# Patient Record
Sex: FEMALE | Race: BLACK OR AFRICAN AMERICAN | NOT HISPANIC OR LATINO | Employment: PART TIME | URBAN - METROPOLITAN AREA
[De-identification: names, ages, dates, MRNs, and addresses within clinical notes are randomized per-mention and may not be internally consistent; named-entity substitution may affect disease eponyms.]

---

## 2017-02-08 ENCOUNTER — HOSPITAL ENCOUNTER (EMERGENCY)
Facility: HOSPITAL | Age: 27
Discharge: HOME | End: 2017-02-08
Attending: EMERGENCY MEDICINE
Payer: MEDICAID

## 2017-02-08 VITALS
RESPIRATION RATE: 16 BRPM | OXYGEN SATURATION: 99 % | HEIGHT: 64 IN | WEIGHT: 130 LBS | BODY MASS INDEX: 22.2 KG/M2 | SYSTOLIC BLOOD PRESSURE: 126 MMHG | DIASTOLIC BLOOD PRESSURE: 75 MMHG | TEMPERATURE: 98 F | HEART RATE: 70 BPM

## 2017-02-08 DIAGNOSIS — S09.93XA BLUNT TRAUMA OF FACE, INITIAL ENCOUNTER: Primary | ICD-10-CM

## 2017-02-08 DIAGNOSIS — S09.90XA CLOSED HEAD INJURY, INITIAL ENCOUNTER: ICD-10-CM

## 2017-02-08 DIAGNOSIS — S03.2XXA: ICD-10-CM

## 2017-02-08 DIAGNOSIS — S02.5XXA: ICD-10-CM

## 2017-02-08 DIAGNOSIS — R56.9 SEIZURE: ICD-10-CM

## 2017-02-08 DIAGNOSIS — S03.2XXA TOOTH AVULSION, INITIAL ENCOUNTER: ICD-10-CM

## 2017-02-08 LAB
ALBUMIN SERPL BCP-MCNC: 3.9 G/DL
ALP SERPL-CCNC: 71 U/L
ALT SERPL W/O P-5'-P-CCNC: 5 U/L
AMPHET+METHAMPHET UR QL: NEGATIVE
ANION GAP SERPL CALC-SCNC: 8 MMOL/L
AST SERPL-CCNC: 13 U/L
B-HCG UR QL: NEGATIVE
BARBITURATES UR QL SCN>200 NG/ML: NEGATIVE
BASOPHILS # BLD AUTO: 0.03 K/UL
BASOPHILS NFR BLD: 0.5 %
BENZODIAZ UR QL SCN>200 NG/ML: NEGATIVE
BILIRUB SERPL-MCNC: 0.4 MG/DL
BUN SERPL-MCNC: 11 MG/DL
BZE UR QL SCN: NEGATIVE
CALCIUM SERPL-MCNC: 9.3 MG/DL
CANNABINOIDS UR QL SCN: NEGATIVE
CHLORIDE SERPL-SCNC: 108 MMOL/L
CO2 SERPL-SCNC: 24 MMOL/L
CREAT SERPL-MCNC: 0.9 MG/DL
CREAT UR-MCNC: 92.7 MG/DL
CTP QC/QA: YES
DIFFERENTIAL METHOD: ABNORMAL
EOSINOPHIL # BLD AUTO: 0.2 K/UL
EOSINOPHIL NFR BLD: 3.3 %
ERYTHROCYTE [DISTWIDTH] IN BLOOD BY AUTOMATED COUNT: 11.8 %
EST. GFR  (AFRICAN AMERICAN): >60 ML/MIN/1.73 M^2
EST. GFR  (NON AFRICAN AMERICAN): >60 ML/MIN/1.73 M^2
GLUCOSE SERPL-MCNC: 70 MG/DL
HCT VFR BLD AUTO: 35.1 %
HGB BLD-MCNC: 11.7 G/DL
LYMPHOCYTES # BLD AUTO: 2.2 K/UL
LYMPHOCYTES NFR BLD: 36.3 %
MAGNESIUM SERPL-MCNC: 2.1 MG/DL
MCH RBC QN AUTO: 29 PG
MCHC RBC AUTO-ENTMCNC: 33.3 %
MCV RBC AUTO: 87 FL
METHADONE UR QL SCN>300 NG/ML: NEGATIVE
MONOCYTES # BLD AUTO: 0.4 K/UL
MONOCYTES NFR BLD: 6.5 %
NEUTROPHILS # BLD AUTO: 3.2 K/UL
NEUTROPHILS NFR BLD: 53.4 %
OPIATES UR QL SCN: NEGATIVE
PCP UR QL SCN>25 NG/ML: NEGATIVE
PLATELET # BLD AUTO: 161 K/UL
PMV BLD AUTO: 10.2 FL
POCT GLUCOSE: 70 MG/DL (ref 70–110)
POTASSIUM SERPL-SCNC: 4.4 MMOL/L
PROT SERPL-MCNC: 7.3 G/DL
RBC # BLD AUTO: 4.04 M/UL
SODIUM SERPL-SCNC: 140 MMOL/L
TOXICOLOGY INFORMATION: NORMAL
WBC # BLD AUTO: 5.98 K/UL

## 2017-02-08 PROCEDURE — 81025 URINE PREGNANCY TEST: CPT | Performed by: EMERGENCY MEDICINE

## 2017-02-08 PROCEDURE — 82570 ASSAY OF URINE CREATININE: CPT

## 2017-02-08 PROCEDURE — 85025 COMPLETE CBC W/AUTO DIFF WBC: CPT

## 2017-02-08 PROCEDURE — 82962 GLUCOSE BLOOD TEST: CPT

## 2017-02-08 PROCEDURE — 80053 COMPREHEN METABOLIC PANEL: CPT

## 2017-02-08 PROCEDURE — 83735 ASSAY OF MAGNESIUM: CPT

## 2017-02-08 PROCEDURE — 99285 EMERGENCY DEPT VISIT HI MDM: CPT

## 2017-02-08 RX ORDER — IBUPROFEN 600 MG/1
600 TABLET ORAL EVERY 6 HOURS PRN
Qty: 20 TABLET | Refills: 0 | Status: SHIPPED | OUTPATIENT
Start: 2017-02-08 | End: 2020-07-11 | Stop reason: SDUPTHER

## 2017-02-08 RX ORDER — AMOXICILLIN 500 MG/1
1000 CAPSULE ORAL EVERY 12 HOURS
Qty: 40 CAPSULE | Refills: 0 | Status: SHIPPED | OUTPATIENT
Start: 2017-02-08 | End: 2017-02-08

## 2017-02-08 RX ORDER — HYDROCODONE BITARTRATE AND ACETAMINOPHEN 5; 325 MG/1; MG/1
1-2 TABLET ORAL EVERY 4 HOURS PRN
Qty: 20 TABLET | Refills: 0 | Status: SHIPPED | OUTPATIENT
Start: 2017-02-08 | End: 2017-02-18

## 2017-02-08 RX ORDER — AMOXICILLIN 500 MG/1
1000 CAPSULE ORAL EVERY 12 HOURS
Qty: 40 CAPSULE | Refills: 0 | Status: SHIPPED | OUTPATIENT
Start: 2017-02-08 | End: 2017-02-28

## 2017-02-08 NOTE — ED PROVIDER NOTES
"Encounter Date: 2/8/2017    SCRIBE #1 NOTE: I, Kings Waldrop, am scribing for, and in the presence of,  Camilo Stallworth MD. I have scribed the following portions of the note - Other sections scribed: HPI, ROS.       History     Chief Complaint   Patient presents with    Seizures     Per EMS, patient had witness seizure at home for approx. 3 min. Described as "all over shaking". No seizure history. Fall from standing position.      Review of patient's allergies indicates:  No Known Allergies  HPI Comments: CC: Seizures    HPI: This 26 y.o smoker female with a PMHx of Protein S deficiency, anemia, blood dyscrasia presents to the ED for emergent evaluation following a seizure and fall this morning. Pt's mother reports her daughter was standing and suddenly fell and started convulsing with non-responsive eyes for a few minutes. Pt's mother says she immediately stuck her fingers in her daughter's mouth to prevent her from biting her tongue and called EMS. Pt has no recollection of the incident. Pt presents with a bloody mouth with various chipped and missing teeth. Pt also c/o R facial pain in her cheek. Pt is non-compliant with blood medication. No other symptoms reported. Pt denied pain medication. Pt denies any previous seizures, fever, chills, nausea, vomiting, diarrhea, headaches, dizziness, or any other associated symptoms.     The history is provided by the patient and a relative.     Past Medical History   Diagnosis Date    Anemia     Blood dyscrasia      Protein S Deficiency    Protein S deficiency      Past Medical History Pertinent Negatives   Diagnosis Date Noted    Abnormal Pap smear of cervix 8/29/2016     Past Surgical History   Procedure Laterality Date    Dilation and curettage of uterus  2010     Spontaneous pregnancy  loss @ 16 weeks,     Cervical cerclage  2011, 2012     Family History   Problem Relation Age of Onset    Hypertension Maternal Grandfather     Coronary artery disease Maternal " Grandfather     Alzheimer's disease Paternal Grandmother     No Known Problems Maternal Grandmother     Hypertension Father     No Known Problems Mother     Breast cancer Neg Hx     Ovarian cancer Neg Hx     Colon cancer Neg Hx     Stroke Neg Hx     Diabetes Neg Hx     Cancer Neg Hx      Social History   Substance Use Topics    Smoking status: Former Smoker    Smokeless tobacco: Never Used    Alcohol use No      Comment: occasionally     Review of Systems   Constitutional: Negative for chills and fever.   HENT: Negative for congestion, ear pain and sore throat.    Eyes: Negative for pain.   Respiratory: Negative for cough and shortness of breath.    Cardiovascular: Negative for chest pain.   Gastrointestinal: Negative for abdominal pain, diarrhea, nausea and vomiting.   Genitourinary: Negative for dysuria.   Musculoskeletal: Negative for back pain.        (+) R facial pain  (+) Bloody mouth, chipped & missing teeth   Skin: Negative for rash.   Neurological: Positive for seizures and syncope. Negative for dizziness, weakness and headaches.   Psychiatric/Behavioral: Negative for confusion.       Physical Exam   Initial Vitals   BP Pulse Resp Temp SpO2   02/08/17 1045 02/08/17 1045 02/08/17 1045 02/08/17 1045 02/08/17 1045   122/70 107 18 98 °F (36.7 °C) 99 %     Physical Exam  The patient was examined specifically for the following:   General:No significant distress, Good color, Warm and dry. Head and neck:Scalp atraumatic, Neck supple. Neurological:Appropriate conversation, Gross motor deficits. Eyes:Conjugate gaze, Clear corneas. ENT: No epistaxis. Cardiac: Regular rate and rhythm, Grossly normal heart tones. Pulmonary: Wheezing, Rales. Gastrointestinal: Abdominal tenderness, Abdominal distention. Musculoskeletal: Extremity deformity, Apparent pain with range of motion of the joints. Skin: Rash.   The findings on examination were normal except for the following: The patient has a posterior luxation of  the right first upper incisor.  There is a complete avulsion of the right upper second incisor.  There is an old fracture of the left upper second incisor.  There is an acute fracture of the left upper first incisor. There is some mild looseness of the entire incisor complex.  I believe this is a palpable fracture of the alveolar bone.  The mandible is stable there is right periorbital ecchymosis.  Mental status examination, cranial nerves, motor and sensory examination are normal.  The spine is nontender along its entire course.  The patient is a heart rate of 107.  The lungs are clear the heart tones are normally abdomen soft chest abdomen pelvis and extremities are all nontender there is no pale range of motion of any joints.  The scalp is otherwise atraumatic.    ED Course   Procedures  Labs Reviewed   COMPREHENSIVE METABOLIC PANEL - Abnormal; Notable for the following:        Result Value    ALT 5 (*)     All other components within normal limits   CBC W/ AUTO DIFFERENTIAL - Abnormal; Notable for the following:     Hemoglobin 11.7 (*)     Hematocrit 35.1 (*)     All other components within normal limits   MAGNESIUM   DRUG SCREEN PANEL, URINE EMERGENCY   POCT URINE PREGNANCY   POCT GLUCOSE          X-Rays:   Independently Interpreted Readings:   Other Readings:  CT of head and facial bones fails to reveal significant abnormalities.    Medical decision making: Given the above, this patient presents to the emergency room with blunt facial trauma I closed head injury history of a seizure dental fractures dental avulsions and dental luxation's.  I will treat this patient with amoxicillin and discharge her to follow-up with oral surgery I will treat her for pain.  I will have her follow-up with neurology.  This is a first seizure.  The patient has urine tox screen that is negative.  Electrolytes are unremarkable.  I could find no reason for this seizure.  The patient has some blunt facial trauma but no facial bone  fractures is no intracranial bleeding.  I will discharge to outpatient evaluation and treatment.               Scribe Attestation:   Scribe #1: I performed the above scribed service and the documentation accurately describes the services I performed. I attest to the accuracy of the note.    Attending Attestation:           Physician Attestation for Scribe:  Physician Attestation Statement for Scribe #1: I, Camilo Stallworth MD, reviewed documentation, as scribed by Kings Waldrop in my presence, and it is both accurate and complete.                 ED Course     Clinical Impression:   The primary encounter diagnosis was Blunt trauma of face, initial encounter. Diagnoses of Seizure, Closed head injury, initial encounter, Tooth avulsion, initial encounter, Fracture of tooth, initial encounter, and Luxation of teeth, initial encounter were also pertinent to this visit.          Camilo Stallworth MD  02/15/17 0892

## 2017-02-08 NOTE — ED TRIAGE NOTES
"Family member reports patient had witnessed seizure at home for approximately 2 minutes. Fell from standing position and hit face. Patient awake and oriented x 3 on arrival. Pt denies new medications. Seizure described as "all over shaking". Pt has right sided facial swelling and dry blood to mouth.   "

## 2017-02-08 NOTE — DISCHARGE INSTRUCTIONS
Please return immediately if you get worse or if new problems develop.  Please make an appointment to see your primary care doctor this week.  Rest.  Please follow-up with Dr. Walls, oral surgery for your damaged teeth.  These follow-up with the neurologist, Dr. Quiros for your seizures.  You may follow-up with her primary care doctor as needed.  No driving.  Seizure precautions.  No dangerous environment, no high places.

## 2017-02-08 NOTE — ED NOTES
Pt states she is in pain 7/10 but does not want any pain medication at this time.  Pt was offered a mild analgesic such as tylenol and pt declined.

## 2017-02-08 NOTE — ED AVS SNAPSHOT
[facility]  [address]  [place] [address]               Sagar Benezett   2017 10:50 AM   ED    Description:  Female : 1990   Department:  [facility]           Your Care was Coordinated By:     Provider Role From To    Camilo Stallworth MD Attending Provider 17 1051 --      Reason for Visit     Seizures           Diagnoses this Visit        Comments    Blunt trauma of face, initial encounter    -  Primary     Seizure         Closed head injury, initial encounter         Tooth avulsion, initial encounter         Fracture of tooth, initial encounter         Luxation of teeth, initial encounter           ED Disposition     None           To Do List           Follow-up Information     Follow up with Silverio Greer MD In 3 days.    Specialties:  Internal Medicine, Oncology, Hematology and Oncology    Contact information:    [address]  [address]  [place] [address]  [phone]          Follow up with Jerson Walls MD In 1 day.    Specialty:  Oral and Maxillofacial Surgery    Contact information:    [address]  [address]  [place] [address]  [phone]          Follow up with Maurizio Quiros MD In 3 days.    Specialty:  Neurology    Why:  for seizure    Contact information:    [address]  [address]  [place] [address]  [phone]         These Medications        Disp Refills Start End    hydrocodone-acetaminophen 5-325mg (NORCO) 5-325 mg per tablet 20 tablet 0 2017    Take 1-2 tablets by mouth every 4 (four) hours as needed for Pain. - Oral    Pharmacy: [facility]/pharmacy #[id number] - [address] Ph #: [phone]       ibuprofen (ADVIL,MOTRIN) 600 MG tablet 20 tablet 0 2017     Take 1 tablet (600 mg total) by mouth every 6 (six) hours as needed for Pain. - Oral    Pharmacy: [facility]/pharmacy #[id number] - [address] Ph #: [phone]        amoxicillin (AMOXIL) 500 MG capsule 40 capsule 0 2/8/2017 2/28/2017    Take 2 capsules (1,000 mg total) by mouth every 12 (twelve) hours. - Oral    Pharmacy: [facility]/pharmacy #[id number] - [place], [place] - [address] [place] [address] #: [phone]         [facility]     [facility] On Call Nurse Care Line - 24/7 Assistance  Registered nurses in the [facility] On Call Center provide clinical advisement, health education, appointment booking, and other advisory services.  Call for this free service at [phone].             Medications           Message regarding Medications     Verify the changes and/or additions to your medication regime listed below are the same as discussed with your clinician today.  If any of these changes or additions are incorrect, please notify your healthcare provider.        START taking these NEW medications        Refills    hydrocodone-acetaminophen 5-325mg (NORCO) 5-325 mg per tablet 0    Sig: Take 1-2 tablets by mouth every 4 (four) hours as needed for Pain.    Class: Print    Route: Oral    ibuprofen (ADVIL,MOTRIN) 600 MG tablet 0    Sig: Take 1 tablet (600 mg total) by mouth every 6 (six) hours as needed for Pain.    Class: Print    Route: Oral    amoxicillin (AMOXIL) 500 MG capsule 0    Sig: Take 2 capsules (1,000 mg total) by mouth every 12 (twelve) hours.    Class: Normal    Route: Oral           Verify that the below list of medications is an accurate representation of the medications you are currently taking.  If none reported, the list may be blank. If incorrect, please contact your healthcare provider. Carry this list with you in case of emergency.           Current Medications     amoxicillin (AMOXIL) 500 MG capsule Take 2 capsules (1,000 mg total) by mouth every 12 (twelve) hours.    ferrous sulfate 325 mg (65 mg iron) Tab tablet Take 325 mg by mouth daily with breakfast.    hydrocodone-acetaminophen 5-325mg (NORCO) 5-325 mg per tablet Take 1-2 tablets by mouth every 4 (four)  "hours as needed for Pain.    ibuprofen (ADVIL,MOTRIN) 600 MG tablet Take 1 tablet (600 mg total) by mouth every 6 (six) hours as needed for Pain.    oxycodone-acetaminophen (PERCOCET) 5-325 mg per tablet Take 1 tablet by mouth every 4 (four) hours as needed.    PNV no.21-iron-folic acid (PREFERA-OB) 28-6-1 mg Tab Take 1 tablet by mouth once daily.           Clinical Reference Information           Your Vitals Were     BP Pulse Temp Resp Height Weight    127/67 76 98 °F (36.7 °C) (Oral) 18 5' 4" (1.626 m) 59 kg (130 lb)    Last Period SpO2 BMI          03/28/2016 99% 22.31 kg/m2        Allergies as of 2/8/2017     No Known Allergies      Immunizations Administered on Date of Encounter - 2/8/2017     None      ED Micro, Lab, POCT     Start Ordered       Status Ordering Provider    02/08/17 1118 02/08/17 1117  Drug screen panel, emergency  STAT      Final result     02/08/17 1117 02/08/17 1116  POCT urine pregnancy  Once      Final result     02/08/17 1116 02/08/17 1116  Comprehensive metabolic panel  STAT      Final result     02/08/17 1116 02/08/17 1116  CBC auto differential  STAT      Final result     02/08/17 1116 02/08/17 1116  Magnesium  STAT      Final result     02/08/17 1101 02/08/17 1101  POCT glucose  Once      Final result       ED Imaging Orders     Start Ordered       Status Ordering Provider    02/08/17 1118 02/08/17 1117  CT Maxillofacial Without Contrast  1 time imaging      Final result     02/08/17 1116 02/08/17 1116  CT Head Without Contrast  1 time imaging      Final result         Discharge Instructions       Please return immediately if you get worse or if new problems develop.  Please make an appointment to see your primary care doctor this week.  Rest.  Please follow-up with Dr. Walls, oral surgery for your damaged teeth.  These follow-up with the neurologist, Dr. Quiros for your seizures.  You may follow-up with her primary care doctor as needed.  No driving.  Seizure precautions.  No " dangerous environment, no high places.    Smoking Cessation     If you would like to quit smoking:   You may be eligible for free services if you are a [place] resident and started smoking cigarettes before September 1, 1988.  Call the [employer] ([employer]) toll free at (664) 423-0426 or (284) 843-7241.   Call 1-800-QUIT-NOW if you do not meet the above criteria.             [facility] complies with applicable Federal civil rights laws and does not discriminate on the basis of race, color, national origin, age, disability, or sex.        Language Assistance Services     ATTENTION: Language assistance services are available, free of charge. Please call [phone].      ATENCIÓN: Si habla español, tiene a lovell disposición servicios gratuitos de asistencia lingüística. Llame al [phone].     CHÚ Ý: N?u b?n nói Ti?ng Vi?t, có các d?ch v? h? tr? ngôn ng? mi?n phí dành cho b?n. G?i s? [phone].

## 2019-03-18 ENCOUNTER — HOSPITAL ENCOUNTER (EMERGENCY)
Facility: HOSPITAL | Age: 29
Discharge: HOME | End: 2019-03-19
Attending: EMERGENCY MEDICINE
Payer: MEDICAID

## 2019-03-18 VITALS
HEIGHT: 67 IN | HEART RATE: 89 BPM | SYSTOLIC BLOOD PRESSURE: 119 MMHG | WEIGHT: 130 LBS | DIASTOLIC BLOOD PRESSURE: 65 MMHG | RESPIRATION RATE: 20 BRPM | OXYGEN SATURATION: 98 % | TEMPERATURE: 99 F | BODY MASS INDEX: 20.4 KG/M2
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
## 2019-03-18 DIAGNOSIS — S61.309A AVULSION OF FINGERNAIL OF LEFT HAND: ICD-10-CM
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
## 2019-03-18 LAB
B-HCG UR QL: NEGATIVE
CTP QC/QA: YES

## 2019-03-18 PROCEDURE — 63600175 PHARM REV CODE 636 W HCPCS: Performed by: NURSE PRACTITIONER

## 2019-03-18 PROCEDURE — 25000003 PHARM REV CODE 250: Performed by: NURSE PRACTITIONER

## 2019-03-18 PROCEDURE — 81025 URINE PREGNANCY TEST: CPT | Performed by: PHYSICIAN ASSISTANT

## 2019-03-18 PROCEDURE — 99284 EMERGENCY DEPT VISIT MOD MDM: CPT | Mod: 25

## 2019-03-18 PROCEDURE — 96372 THER/PROPH/DIAG INJ SC/IM: CPT

## 2019-03-18 RX ORDER — OXYCODONE AND ACETAMINOPHEN 5; 325 MG/1; MG/1
1 TABLET ORAL
Status: COMPLETED | OUTPATIENT
Start: 2019-03-18 | End: 2019-03-18

## 2019-03-18 RX ORDER — AMOXICILLIN AND CLAVULANATE POTASSIUM 875; 125 MG/1; MG/1
1 TABLET, FILM COATED ORAL EVERY 12 HOURS
Qty: 10 TABLET | Refills: 0 | Status: SHIPPED | OUTPATIENT
Start: 2019-03-18 | End: 2019-03-23

## 2019-03-18 RX ORDER — NAPROXEN 500 MG/1
500 TABLET ORAL EVERY 12 HOURS PRN
Qty: 15 TABLET | Refills: 0 | OUTPATIENT
Start: 2019-03-18 | End: 2020-07-11

## 2019-03-18 RX ORDER — LIDOCAINE HYDROCHLORIDE 20 MG/ML
10 INJECTION, SOLUTION INFILTRATION; PERINEURAL
Status: COMPLETED | OUTPATIENT
Start: 2019-03-18 | End: 2019-03-18

## 2019-03-18 RX ORDER — KETOROLAC TROMETHAMINE 30 MG/ML
30 INJECTION, SOLUTION INTRAMUSCULAR; INTRAVENOUS
Status: COMPLETED | OUTPATIENT
Start: 2019-03-18 | End: 2019-03-18

## 2019-03-18 RX ADMIN — KETOROLAC TROMETHAMINE 30 MG: 30 INJECTION INTRAMUSCULAR; INTRAVENOUS at 08:03

## 2019-03-18 RX ADMIN — BACITRACIN, NEOMYCIN, POLYMYXIN B 1 EACH: 400; 3.5; 5 OINTMENT TOPICAL at 10:03

## 2019-03-18 RX ADMIN — OXYCODONE AND ACETAMINOPHEN 1 TABLET: 5; 325 TABLET ORAL at 09:03

## 2019-03-18 RX ADMIN — LIDOCAINE HYDROCHLORIDE 10 ML: 20 INJECTION, SOLUTION INFILTRATION; PERINEURAL at 09:03
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
## 2019-03-19 NOTE — DISCHARGE INSTRUCTIONS
Take antibiotics once every 12 hours (twice daily) for 5 days until they are gone.  You should not have any pills left over.  Take pain medication as needed only as prescribed.  Wound care to your finger as discussed.  Follow-up with your regular doctor for re-evaluation and further treatment.  Return to the emergency department for any new or worsening symptoms or as needed for any additional concerns.    Thank you for coming to our Emergency Department today. It is important to remember that some problems are difficult to diagnose and may not be found during your first visit. Be sure to follow up with your primary care doctor.  If you do not have one, you may contact the one listed on your discharge paperwork or you may also call the [facility] Appointment Desk at [phone] to schedule an appointment with one.     Return to the ER with any questions/concerns, new/concerning symptoms, worsening or failure to improve. Do not drive or make any important decisions for 24 hours if you have received any pain medications, sedatives or mood altering drugs during your ER visit.

## 2020-03-18 ENCOUNTER — HOSPITAL ENCOUNTER (EMERGENCY)
Facility: HOSPITAL | Age: 30
Discharge: HOME | End: 2020-03-18
Attending: EMERGENCY MEDICINE

## 2020-03-18 VITALS
HEART RATE: 79 BPM | BODY MASS INDEX: 23.54 KG/M2 | WEIGHT: 150 LBS | DIASTOLIC BLOOD PRESSURE: 71 MMHG | OXYGEN SATURATION: 96 % | SYSTOLIC BLOOD PRESSURE: 133 MMHG | TEMPERATURE: 98 F | RESPIRATION RATE: 20 BRPM | HEIGHT: 67 IN

## 2020-03-18 DIAGNOSIS — R22.0 FACIAL SWELLING: ICD-10-CM

## 2020-03-18 DIAGNOSIS — K08.89 PAIN, DENTAL: Primary | ICD-10-CM

## 2020-03-18 PROCEDURE — 99283 EMERGENCY DEPT VISIT LOW MDM: CPT

## 2020-03-18 RX ORDER — PENICILLIN V POTASSIUM 500 MG/1
500 TABLET, FILM COATED ORAL 4 TIMES DAILY
Qty: 40 TABLET | Refills: 0 | Status: SHIPPED | OUTPATIENT
Start: 2020-03-18 | End: 2020-03-28

## 2020-03-19 NOTE — ED PROVIDER NOTES
Encounter Date: 3/18/2020       History     Chief Complaint   Patient presents with    Abscess     Reports a left jaw abscess that started approx x2 days ago. Reports increase in size and jaw is painful. Took Tylenol with no relief.      Chief Complaint:  Facial pain and swelling  History of  Present Illness: History obtained from patient. This 29 y.o. female who has no known past medical history presents to the ED complaining of left lower dental pain and left-sided facial swelling for 2 days.  Denies difficulty swallowing, shortness of breath, fever.  No prior treatment for symptoms.        Review of patient's allergies indicates:  No Known Allergies  Past Medical History:   Diagnosis Date    Anemia     Blood dyscrasia     Protein S Deficiency    Protein S deficiency      Past Surgical History:   Procedure Laterality Date    CERVICAL CERCLAGE  ,      SECTION      DILATION AND CURETTAGE OF UTERUS      Spontaneous pregnancy  loss @ 16 weeks,      Family History   Problem Relation Age of Onset    Hypertension Maternal Grandfather     Coronary artery disease Maternal Grandfather     Alzheimer's disease Paternal Grandmother     No Known Problems Maternal Grandmother     Hypertension Father     No Known Problems Mother     Breast cancer Neg Hx     Ovarian cancer Neg Hx     Colon cancer Neg Hx     Stroke Neg Hx     Diabetes Neg Hx     Cancer Neg Hx      Social History     Tobacco Use    Smoking status: Current Every Day Smoker     Packs/day: 0.00    Smokeless tobacco: Never Used   Substance Use Topics    Alcohol use: No     Comment: occasionally    Drug use: No     Review of Systems   Constitutional: Negative for chills and fever.   HENT: Positive for dental problem. Negative for congestion, rhinorrhea and sore throat.         (+) left-sided facial   Eyes: Negative for visual disturbance.   Respiratory: Negative for cough and shortness of breath.    Cardiovascular: Negative for  chest pain.   Gastrointestinal: Negative for abdominal pain, diarrhea, nausea and vomiting.   Genitourinary: Negative for dysuria, frequency and hematuria.   Musculoskeletal: Negative for back pain.   Skin: Negative for rash.   Neurological: Negative for dizziness, weakness and headaches.       Physical Exam     Initial Vitals [03/18/20 2054]   BP Pulse Resp Temp SpO2   120/67 (!) 118 20 98.9 °F (37.2 °C) 98 %      MAP       --         Physical Exam    Nursing note and vitals reviewed.  Constitutional: She appears well-developed and well-nourished. No distress.   HENT:   Head: Normocephalic and atraumatic.   Right Ear: Tympanic membrane normal.   Left Ear: Tympanic membrane normal.   Nose: Nose normal.   Mouth/Throat: Uvula is midline, oropharynx is clear and moist and mucous membranes are normal.   Tooth 19 is cracked tenderness with palpation.  There is no gingival fluctuance.  There is mild left-sided facial edema.  Posterior oropharynx is clear.  Airway is patent.   Eyes: EOM are normal. Pupils are equal, round, and reactive to light.   Neck: Trachea normal, normal range of motion, full passive range of motion without pain and phonation normal. Neck supple. No stridor present. No spinous process tenderness and no muscular tenderness present. Normal range of motion present. No neck rigidity.   Cardiovascular: Normal rate, regular rhythm and normal heart sounds. Exam reveals no gallop and no friction rub.    No murmur heard.  Pulmonary/Chest: Effort normal and breath sounds normal. No respiratory distress. She has no wheezes. She has no rhonchi. She has no rales.   Abdominal: Soft. Bowel sounds are normal. There is no tenderness. There is no rebound and no guarding.   Musculoskeletal: Normal range of motion.   Neurological: She is alert and oriented to person, place, and time. She has normal strength. No cranial nerve deficit or sensory deficit.   Skin: Skin is warm and dry. Capillary refill takes less than 2  seconds.   Psychiatric: She has a normal mood and affect.         ED Course   Procedures  Labs Reviewed - No data to display       Imaging Results    None          Medical Decision Making:   ED Management:  This is an evaluation of a 29 y.o. female that presents to the Emergency Department for dental pain. The patient reports no fever, chills, nausea, vomiting, or inability to open mouth. Physical Exam shows a non-toxic, afebrile, and well appearing female with pain to tooth 19. There is no facial swelling. There is no visible oral drainable abscess at this time. She has no facial cellulitis, oral swelling, airway compromise, sublingual swelling/elevation, or trismus. Neck spaces are soft. There is no gingival erythema on the facial and lingual surfaces surrounding the tooth.    Vital signs are reassuring.     My overall impression is dental pain with facial swelling. I considered, but at this time, do not suspect Sumit's angina, deep space infection, peritonsillar infection, pharyngitis, mastoiditis, or a facial cellulitis.    Will discharge patient home with pen VK.  The diagnosis, treatment plan, instructions for follow-up and reevaluation with a dentist as well as ED return precautions were discussed and understanding was verbalized. All questions or concerns have been addressed.                                  Clinical Impression:       ICD-10-CM ICD-9-CM   1. Pain, dental K08.89 525.9   2. Facial swelling R22.0 784.2             ED Disposition Condition    Discharge Stable        ED Prescriptions     Medication Sig Dispense Start Date End Date Auth. Provider    penicillin v potassium (VEETID) 500 MG tablet Take 1 tablet (500 mg total) by mouth 4 (four) times daily. for 10 days 40 tablet 3/18/2020 3/28/2020 Jair Linares PA-C        Follow-up Information     Follow up With Specialties Details Why Contact Info    [facility]  Schedule an appointment as soon as possible for a visit in 1 day For  reevaluation [address] [facility].  [place] [address] [phone]  Monday-Friday 9:00 AM-4:00 PM    [facility]  Schedule an appointment as soon as possible for a visit in 1 day For reevaluation [address].  [place] [address] [phone]  Monday-Thursday (Arrive at 8:00 AM) NO LATER    [facility]  Schedule an appointment as soon as possible for a visit in 1 day For reevaluation [address] [address] [phone]  Monday-Friday 8:00 AM-5:00 PM                                     Jair Linares PA-C  03/18/20 6843

## 2020-07-11 ENCOUNTER — HOSPITAL ENCOUNTER (EMERGENCY)
Facility: HOSPITAL | Age: 30
Discharge: HOME | End: 2020-07-11
Attending: EMERGENCY MEDICINE

## 2020-07-11 VITALS
HEIGHT: 67 IN | WEIGHT: 135 LBS | DIASTOLIC BLOOD PRESSURE: 72 MMHG | SYSTOLIC BLOOD PRESSURE: 105 MMHG | HEART RATE: 115 BPM | RESPIRATION RATE: 16 BRPM | OXYGEN SATURATION: 98 % | BODY MASS INDEX: 21.19 KG/M2 | TEMPERATURE: 98 F

## 2020-07-11 DIAGNOSIS — R07.9 ACUTE CHEST PAIN: Primary | ICD-10-CM

## 2020-07-11 DIAGNOSIS — R07.9 CHEST PAIN: ICD-10-CM

## 2020-07-11 DIAGNOSIS — I30.9 ACUTE PERICARDITIS, UNSPECIFIED TYPE: ICD-10-CM

## 2020-07-11 DIAGNOSIS — K08.89 PAIN, DENTAL: ICD-10-CM

## 2020-07-11 LAB
ALBUMIN SERPL BCP-MCNC: 3.9 G/DL (ref 3.5–5.2)
ALP SERPL-CCNC: 71 U/L (ref 55–135)
ALT SERPL W/O P-5'-P-CCNC: 11 U/L (ref 10–44)
ANION GAP SERPL CALC-SCNC: 12 MMOL/L (ref 8–16)
AST SERPL-CCNC: 15 U/L (ref 10–40)
B-HCG UR QL: NEGATIVE
BASOPHILS # BLD AUTO: 0.03 K/UL (ref 0–0.2)
BASOPHILS NFR BLD: 0.3 % (ref 0–1.9)
BILIRUB SERPL-MCNC: 0.7 MG/DL (ref 0.1–1)
BUN SERPL-MCNC: 9 MG/DL (ref 6–20)
CALCIUM SERPL-MCNC: 10.2 MG/DL (ref 8.7–10.5)
CHLORIDE SERPL-SCNC: 101 MMOL/L (ref 95–110)
CO2 SERPL-SCNC: 25 MMOL/L (ref 23–29)
CREAT SERPL-MCNC: 1.1 MG/DL (ref 0.5–1.4)
CRP SERPL-MCNC: 235.8 MG/L (ref 0–8.2)
CTP QC/QA: YES
DIFFERENTIAL METHOD: ABNORMAL
EOSINOPHIL # BLD AUTO: 0.1 K/UL (ref 0–0.5)
EOSINOPHIL NFR BLD: 0.6 % (ref 0–8)
ERYTHROCYTE [DISTWIDTH] IN BLOOD BY AUTOMATED COUNT: 11.7 % (ref 11.5–14.5)
ERYTHROCYTE [SEDIMENTATION RATE] IN BLOOD BY WESTERGREN METHOD: 34 MM/HR (ref 0–20)
EST. GFR  (AFRICAN AMERICAN): >60 ML/MIN/1.73 M^2
EST. GFR  (NON AFRICAN AMERICAN): >60 ML/MIN/1.73 M^2
GLUCOSE SERPL-MCNC: 111 MG/DL (ref 70–110)
HCT VFR BLD AUTO: 43.1 % (ref 37–48.5)
HGB BLD-MCNC: 14.2 G/DL (ref 12–16)
IMM GRANULOCYTES # BLD AUTO: 0.03 K/UL (ref 0–0.04)
IMM GRANULOCYTES NFR BLD AUTO: 0.3 % (ref 0–0.5)
LYMPHOCYTES # BLD AUTO: 2.7 K/UL (ref 1–4.8)
LYMPHOCYTES NFR BLD: 26.2 % (ref 18–48)
MCH RBC QN AUTO: 31.2 PG (ref 27–31)
MCHC RBC AUTO-ENTMCNC: 32.9 G/DL (ref 32–36)
MCV RBC AUTO: 95 FL (ref 82–98)
MONOCYTES # BLD AUTO: 1.4 K/UL (ref 0.3–1)
MONOCYTES NFR BLD: 13.1 % (ref 4–15)
NEUTROPHILS # BLD AUTO: 6.2 K/UL (ref 1.8–7.7)
NEUTROPHILS NFR BLD: 59.5 % (ref 38–73)
NRBC BLD-RTO: 0 /100 WBC
PLATELET # BLD AUTO: 202 K/UL (ref 150–350)
PMV BLD AUTO: 10 FL (ref 9.2–12.9)
POTASSIUM SERPL-SCNC: 4.1 MMOL/L (ref 3.5–5.1)
PROT SERPL-MCNC: 8.3 G/DL (ref 6–8.4)
RBC # BLD AUTO: 4.55 M/UL (ref 4–5.4)
SARS-COV-2 RDRP RESP QL NAA+PROBE: NEGATIVE
SODIUM SERPL-SCNC: 138 MMOL/L (ref 136–145)
TROPONIN I SERPL DL<=0.01 NG/ML-MCNC: <0.006 NG/ML (ref 0–0.03)
WBC # BLD AUTO: 10.35 K/UL (ref 3.9–12.7)

## 2020-07-11 PROCEDURE — 85652 RBC SED RATE AUTOMATED: CPT

## 2020-07-11 PROCEDURE — 99285 EMERGENCY DEPT VISIT HI MDM: CPT | Mod: 25

## 2020-07-11 PROCEDURE — 86140 C-REACTIVE PROTEIN: CPT

## 2020-07-11 PROCEDURE — 84484 ASSAY OF TROPONIN QUANT: CPT

## 2020-07-11 PROCEDURE — 81025 URINE PREGNANCY TEST: CPT | Performed by: EMERGENCY MEDICINE

## 2020-07-11 PROCEDURE — 96374 THER/PROPH/DIAG INJ IV PUSH: CPT

## 2020-07-11 PROCEDURE — 93010 ELECTROCARDIOGRAM REPORT: CPT | Mod: ,,, | Performed by: INTERNAL MEDICINE

## 2020-07-11 PROCEDURE — 63600175 PHARM REV CODE 636 W HCPCS: Performed by: EMERGENCY MEDICINE

## 2020-07-11 PROCEDURE — 85025 COMPLETE CBC W/AUTO DIFF WBC: CPT

## 2020-07-11 PROCEDURE — 80053 COMPREHEN METABOLIC PANEL: CPT

## 2020-07-11 PROCEDURE — 96375 TX/PRO/DX INJ NEW DRUG ADDON: CPT

## 2020-07-11 PROCEDURE — 93010 EKG 12-LEAD: ICD-10-PCS | Mod: ,,, | Performed by: INTERNAL MEDICINE

## 2020-07-11 PROCEDURE — 93005 ELECTROCARDIOGRAM TRACING: CPT

## 2020-07-11 PROCEDURE — 96376 TX/PRO/DX INJ SAME DRUG ADON: CPT

## 2020-07-11 PROCEDURE — 25000003 PHARM REV CODE 250: Performed by: EMERGENCY MEDICINE

## 2020-07-11 PROCEDURE — U0002 COVID-19 LAB TEST NON-CDC: HCPCS

## 2020-07-11 RX ORDER — OXYCODONE AND ACETAMINOPHEN 5; 325 MG/1; MG/1
1 TABLET ORAL EVERY 6 HOURS PRN
Qty: 15 TABLET | Refills: 0 | Status: SHIPPED | OUTPATIENT
Start: 2020-07-11

## 2020-07-11 RX ORDER — HYDROMORPHONE HYDROCHLORIDE 2 MG/ML
1 INJECTION, SOLUTION INTRAMUSCULAR; INTRAVENOUS; SUBCUTANEOUS
Status: COMPLETED | OUTPATIENT
Start: 2020-07-11 | End: 2020-07-11

## 2020-07-11 RX ORDER — AMOXICILLIN 500 MG/1
1000 CAPSULE ORAL EVERY 12 HOURS
Qty: 40 CAPSULE | Refills: 0 | Status: SHIPPED | OUTPATIENT
Start: 2020-07-11 | End: 2020-07-21

## 2020-07-11 RX ORDER — ONDANSETRON 4 MG/1
4 TABLET, ORALLY DISINTEGRATING ORAL
Status: COMPLETED | OUTPATIENT
Start: 2020-07-11 | End: 2020-07-11

## 2020-07-11 RX ORDER — KETOROLAC TROMETHAMINE 30 MG/ML
30 INJECTION, SOLUTION INTRAMUSCULAR; INTRAVENOUS
Status: COMPLETED | OUTPATIENT
Start: 2020-07-11 | End: 2020-07-11

## 2020-07-11 RX ORDER — COLCHICINE 0.6 MG/1
0.6 TABLET ORAL 2 TIMES DAILY
Qty: 60 TABLET | Refills: 0 | Status: SHIPPED | OUTPATIENT
Start: 2020-07-11 | End: 2020-07-21

## 2020-07-11 RX ORDER — IBUPROFEN 600 MG/1
600 TABLET ORAL EVERY 6 HOURS PRN
Qty: 20 TABLET | Refills: 2 | Status: SHIPPED | OUTPATIENT
Start: 2020-07-11

## 2020-07-11 RX ADMIN — KETOROLAC TROMETHAMINE 30 MG: 30 INJECTION, SOLUTION INTRAMUSCULAR at 09:07

## 2020-07-11 RX ADMIN — ONDANSETRON 4 MG: 4 TABLET, ORALLY DISINTEGRATING ORAL at 10:07

## 2020-07-11 RX ADMIN — HYDROMORPHONE HYDROCHLORIDE 1 MG: 2 INJECTION, SOLUTION INTRAMUSCULAR; INTRAVENOUS; SUBCUTANEOUS at 10:07

## 2020-07-11 RX ADMIN — HYDROMORPHONE HYDROCHLORIDE 1 MG: 2 INJECTION, SOLUTION INTRAMUSCULAR; INTRAVENOUS; SUBCUTANEOUS at 01:07

## 2020-07-11 NOTE — Clinical Note
Sagar Bean was seen and treated in our emergency department on 7/11/2020.  She may return to work on 07/14/2020.       If you have any questions or concerns, please don't hesitate to call.       RN

## 2020-07-11 NOTE — DISCHARGE INSTRUCTIONS
Please follow-up with the cardiologist above this week.  Call Monday morning for an appointment.  Return immediately if you get worse or if new problems develop.  Ibuprofen colchicine and Percocet.  Please rest.  Follow up with dentistry for your dental pain.

## 2020-07-11 NOTE — ED PROVIDER NOTES
Encounter Date: 2020    SCRIBE #1 NOTE: I, Fernanda Luna, am scribing for, and in the presence of,  Camilo Stallworth MD. I have scribed the following portions of the note - Other sections scribed: SURINDER BERGER.       History     Chief Complaint   Patient presents with    Chest Pain     upper chest pain x 2 days     This is a 30 y.o. female with a PMHx of Anemia and Blood Dyscrasia who presents to the Emergency Department with a cc of constant chest pain x3 days. The pt reports that she experienced similar symptoms when she had a URI in the past. Pt reports associated SOB and TREVIZO. Pt denies any cough, ear pain, eye pain, sore throat, abdominal pain, emesis, diarrhea, dysuria, arm pain, or leg pain. Symptoms are worsened by inhaling deeply, movement, laying flat, and pushing on the area. The pt states that she smokes, uses marijuana, and she does not consume alcohol.        The history is provided by the patient. No  was used.     Review of patient's allergies indicates:  No Known Allergies  Past Medical History:   Diagnosis Date    Anemia     Blood dyscrasia     Protein S Deficiency    Protein S deficiency      Past Surgical History:   Procedure Laterality Date    CERVICAL CERCLAGE  ,      SECTION      DILATION AND CURETTAGE OF UTERUS      Spontaneous pregnancy  loss @ 16 weeks,      Family History   Problem Relation Age of Onset    Hypertension Maternal Grandfather     Coronary artery disease Maternal Grandfather     Alzheimer's disease Paternal Grandmother     No Known Problems Maternal Grandmother     Hypertension Father     No Known Problems Mother     Breast cancer Neg Hx     Ovarian cancer Neg Hx     Colon cancer Neg Hx     Stroke Neg Hx     Diabetes Neg Hx     Cancer Neg Hx      Social History     Tobacco Use    Smoking status: Current Every Day Smoker     Packs/day: 0.00    Smokeless tobacco: Never Used   Substance Use Topics    Alcohol use: No      Comment: occasionally    Drug use: Yes     Types: Marijuana     Review of Systems   Constitutional: Negative for chills, diaphoresis and fever.   HENT: Negative for ear pain and sore throat.    Eyes: Negative for photophobia, pain, discharge, redness and itching.   Respiratory: Positive for shortness of breath. Negative for cough.    Cardiovascular: Positive for chest pain.   Gastrointestinal: Negative for abdominal pain, diarrhea, nausea and vomiting.   Genitourinary: Negative for dysuria.   Musculoskeletal: Negative for arthralgias and myalgias.   Skin: Negative for rash.   Neurological: Positive for headaches.       Physical Exam     Initial Vitals   BP Pulse Resp Temp SpO2   07/11/20 0848 07/11/20 0902 07/11/20 0848 07/11/20 0848 07/11/20 0848   (!) 90/50 85 18 97.8 °F (36.6 °C) 96 %      MAP       --                Physical Exam  The patient was examined specifically for the following:   General:No significant distress, Good color, Warm and dry. Head and neck:Scalp atraumatic, Neck supple. Neurological:Appropriate conversation, Gross motor deficits. Eyes:Conjugate gaze, Clear corneas. ENT: No epistaxis. Cardiac: Regular rate and rhythm, Grossly normal heart tones. Pulmonary: Wheezing, Rales. Gastrointestinal: Abdominal tenderness, Abdominal distention. Musculoskeletal: Extremity deformity, Apparent pain with range of motion of the joints. Skin: Rash.   The findings on examination were normal except for the following:  There is mild vague diffuse chest tenderness.  The lungs are clear.  The heart tones are normal.  The abdomen is soft.  I hear no rubs.  ED Course   Procedures  Labs Reviewed   COMPREHENSIVE METABOLIC PANEL - Abnormal; Notable for the following components:       Result Value    Glucose 111 (*)     All other components within normal limits   CBC W/ AUTO DIFFERENTIAL - Abnormal; Notable for the following components:    Mean Corpuscular Hemoglobin 31.2 (*)     Mono # 1.4 (*)     All other components  within normal limits   SEDIMENTATION RATE - Abnormal; Notable for the following components:    Sed Rate 34 (*)     All other components within normal limits   C-REACTIVE PROTEIN - Abnormal; Notable for the following components:    .8 (*)     All other components within normal limits   TROPONIN I   SARS-COV-2 RNA AMPLIFICATION, QUAL   POCT URINE PREGNANCY     EKG Readings: (Independently Interpreted)   This patient is in a normal sinus rhythm with a heart rate of 89.  The patient has ST segment elevations that her diffuse with depression in AVR.  There is also some depression in lead 1.  The elevations are 1-2 mm, generally and up to 3 mm in V2.  These changes likely represent pericarditis       Imaging Results          X-Ray Chest AP Portable (Final result)  Result time 07/11/20 10:18:14    Final result by Elliot Suggs MD (07/11/20 10:18:14)                 Impression:      No active disease.      Electronically signed by: Elliot Suggs MD  Date:    07/11/2020  Time:    10:18             Narrative:    EXAMINATION:  XR CHEST AP PORTABLE    CLINICAL HISTORY:  chest pain;    TECHNIQUE:  Single frontal view of the chest was performed.    COMPARISON:  None    FINDINGS:  EKG leads overlie chest obscuring detail.  Slight elevation left hemidiaphragm.  Probable mild subsegmental atelectasis at the lung bases.  No confluent airspace disease.  Cardiomediastinal silhouette is not enlarged.  Bones appear intact                              Medical decision making:  Patient's EKG is markedly abnormal.  I reviewed it with Dr. Suazo at 8:55 a.m.  We feel this is likely pericarditis.   Dr. Suazo and I reviewed this CRP.  We believe this patient has acute pericarditis.  Will discharge to outpatient evaluation and treatment.  We doubt myocardial infarction myocarditis.  The troponin is negative.  ESR and CRP are markedly elevated.  I will also treat with colchicine.  Patient requests penicillin for left lower jaw pain that  she believes is a dock degenerative.  The patient does have tender teeth in the left lower jaw.  I did not identify caries.                Scribe Attestation:   Scribe #1: I performed the above scribed service and the documentation accurately describes the services I performed. I attest to the accuracy of the note.                          Clinical Impression:       ICD-10-CM ICD-9-CM   1. Acute chest pain  R07.9 786.50   2. Chest pain  R07.9 786.50   3. Acute pericarditis, unspecified type  I30.9 420.90   4. Pain, dental  K08.89 525.9             ED Disposition Condition    Discharge Stable       I personally performed the services described in this documentation.  All medical record  entries made by the scribe are at my direction and in my presence.  Signed, Dr. Stallworth  ED Prescriptions     Medication Sig Dispense Start Date End Date Auth. Provider    ibuprofen (ADVIL,MOTRIN) 600 MG tablet Take 1 tablet (600 mg total) by mouth every 6 (six) hours as needed for Pain. 20 tablet 7/11/2020  Camilo Stallworth MD    colchicine (COLCRYS) 0.6 mg tablet Take 1 tablet (0.6 mg total) by mouth 2 (two) times daily. for 10 days 60 tablet 7/11/2020 7/21/2020 Camilo Stallworth MD    oxyCODONE-acetaminophen (PERCOCET) 5-325 mg per tablet Take 1 tablet by mouth every 6 (six) hours as needed. 15 tablet 7/11/2020  Camilo Stallworth MD        Follow-up Information     Follow up With Specialties Details Why Contact Info    Panfilo Suazo MD Cardiology In 3 days  [address]  [address]  [facility] [address]  [phone]                                       Camilo Stallworth MD  07/11/20 1300       Camilo Stallworth MD  07/11/20 0143

## 2020-07-11 NOTE — ED TRIAGE NOTES
The patient presents to the ED via personal transportation alone. The patient reports a generalized chest pain that radiates to bilateral arms. CP started 2 days ago while patient was working as . Patient also reports sob that started around the same time, and intermittent frontal headaches. Patient denies cough, fevers, chills, sore throat, vomiting, diarrhea.

## 2020-07-11 NOTE — Clinical Note
Sagar Bean was seen and treated in our emergency department on 7/11/2020.  She may return to work on 07/13/2020.       If you have any questions or concerns, please don't hesitate to call.      Debra Laura RN RN

## 2021-04-12 ENCOUNTER — PATIENT MESSAGE (OUTPATIENT)
Dept: RESEARCH | Facility: HOSPITAL | Age: 31
End: 2021-04-12